# Patient Record
Sex: MALE | Race: WHITE | Employment: FULL TIME | URBAN - METROPOLITAN AREA
[De-identification: names, ages, dates, MRNs, and addresses within clinical notes are randomized per-mention and may not be internally consistent; named-entity substitution may affect disease eponyms.]

---

## 2019-06-18 ENCOUNTER — OFFICE VISIT (OUTPATIENT)
Dept: FAMILY MEDICINE | Facility: OTHER | Age: 34
End: 2019-06-18
Payer: COMMERCIAL

## 2019-06-18 VITALS
RESPIRATION RATE: 18 BRPM | TEMPERATURE: 98.1 F | HEART RATE: 64 BPM | WEIGHT: 250.7 LBS | SYSTOLIC BLOOD PRESSURE: 118 MMHG | HEIGHT: 73 IN | BODY MASS INDEX: 33.23 KG/M2 | DIASTOLIC BLOOD PRESSURE: 70 MMHG

## 2019-06-18 DIAGNOSIS — F32.0 MAJOR DEPRESSIVE DISORDER, SINGLE EPISODE, MILD (H): Primary | ICD-10-CM

## 2019-06-18 DIAGNOSIS — F15.11 HISTORY OF METHAMPHETAMINE ABUSE (H): ICD-10-CM

## 2019-06-18 DIAGNOSIS — F41.9 ANXIETY: ICD-10-CM

## 2019-06-18 DIAGNOSIS — Z13.220 SCREENING FOR HYPERLIPIDEMIA: ICD-10-CM

## 2019-06-18 PROCEDURE — 99203 OFFICE O/P NEW LOW 30 MIN: CPT | Performed by: PHYSICIAN ASSISTANT

## 2019-06-18 RX ORDER — BUSPIRONE HYDROCHLORIDE 5 MG/1
5 TABLET ORAL
COMMUNITY
End: 2019-10-15

## 2019-06-18 RX ORDER — BUPROPION HYDROCHLORIDE 150 MG/1
300 TABLET ORAL DAILY
COMMUNITY
End: 2019-06-18

## 2019-06-18 RX ORDER — BUPROPION HYDROCHLORIDE 150 MG/1
300 TABLET ORAL DAILY
Qty: 180 TABLET | Refills: 1 | Status: SHIPPED | OUTPATIENT
Start: 2019-06-18 | End: 2019-10-15

## 2019-06-18 ASSESSMENT — ANXIETY QUESTIONNAIRES
5. BEING SO RESTLESS THAT IT IS HARD TO SIT STILL: NOT AT ALL
GAD7 TOTAL SCORE: 2
7. FEELING AFRAID AS IF SOMETHING AWFUL MIGHT HAPPEN: NOT AT ALL
4. TROUBLE RELAXING: NOT AT ALL
3. WORRYING TOO MUCH ABOUT DIFFERENT THINGS: NOT AT ALL
1. FEELING NERVOUS, ANXIOUS, OR ON EDGE: SEVERAL DAYS
6. BECOMING EASILY ANNOYED OR IRRITABLE: SEVERAL DAYS
GAD7 TOTAL SCORE: 2
7. FEELING AFRAID AS IF SOMETHING AWFUL MIGHT HAPPEN: NOT AT ALL
2. NOT BEING ABLE TO STOP OR CONTROL WORRYING: NOT AT ALL
GAD7 TOTAL SCORE: 2

## 2019-06-18 ASSESSMENT — PATIENT HEALTH QUESTIONNAIRE - PHQ9
SUM OF ALL RESPONSES TO PHQ QUESTIONS 1-9: 1
SUM OF ALL RESPONSES TO PHQ QUESTIONS 1-9: 1

## 2019-06-18 ASSESSMENT — PAIN SCALES - GENERAL: PAINLEVEL: NO PAIN (0)

## 2019-06-18 ASSESSMENT — MIFFLIN-ST. JEOR: SCORE: 2114.04

## 2019-06-18 NOTE — PATIENT INSTRUCTIONS
"  Patient Education   Forms of Depression  Depression can happen to anyone man or woman, young or old. Sometimes it occurs for a reason, such as illness or grief. At other times, no cause can be found.  If you have depression, you cannot simply change your attitude or \"pull yourself out of it.\" You need treatment from a doctor, a therapist, or both.   The following is a list of the most common types of depression.  Reactive depression  Also called \"adjustment disorder with depressed mood.\" Symptoms of depression occur in response to major stress. For example, job loss, moving, the death of a loved one or a troubled relationship all can cause symptoms.  Dysthymia   Dysthymia is mild depression that lasts for at least two years (or at least one year in children). You may feel sad and tired almost every day. You might have low self-esteem. You may worry that you will never feel \"normal\" again.  Major depression   When symptoms last for at least two weeks and seem to have no cause, it is called severe depression. This is a serious illness that affects your mood, your thoughts and even your body. It changes the way you eat, sleep and interact with others. If symptoms are not treated, they can last for months or even years.  Postpartum depression  Postpartum depression may occur in women who have just had a baby. Symptoms last more than two weeks. They may be mild or severe.  Seasonal affective disorder (SAD)  SAD is a type of depression that occurs in the winter months, when there is less sunlight. Symptoms may begin in the late fall, peaking in the winter. When spring comes and the days grow longer, you start to feel better.   Bipolar disorder  Bipolar disorder causes severe mood swings that affect your thoughts, behavior and the way you function in life. This illness used to be called manic-depressive disorder. In the \"manic\" phase, you have lots of energy. You might talk a lot, sleep very little and act in reckless " "ways. In the \"depressed\" phase, you feel sad and low.   continued  Mood disorder caused by physical illness   Certain illnesses create changes in the body that can cause symptoms of depression. Examples include stroke, seizure disorders, Parkinson's disease and some cancers. This is not the same as depression that might occur when you are coping with medical problems.  Depression caused by alcohol and other drugs   Illegal drugs, alcohol and certain medicines can all cause symptoms of depression.   Where can I get more information?  [facility]: [phone]   ([facility]) or [phone] ([facility]: [phone]   or [phone]  Depression and Bipolar Support [place]:   [phone], www.dbsalliance.org  National [place] of Mental Health:   284.357.2446, www.nimh.nih.gov   National [place] on Mental Illness:   983-643-GQTH [5264], www.YAHIR.org  National Mental Health Association:   410-926-TFPS [6642], www.NMHA.org  For informational purposes only. Not to replace the advice of your health care provider.   Copyright   2006 [facility]. All rights reserved.   Clinically reviewed by Delia Mayers. Applaud [id number]   REV 04/18.       Patient Education     Depression Affects Your Mind and Body    Everyone feels sad or  blue  from time to time for a few days or weeks. Depression is when these feelings don't go away and they interfere with daily life.  Depression is a real illness that can develop at any age. It is one of the most common mental health problems in the [place] Depression makes you feel sad, helpless, and hopeless. It gets in the way of your life and relationships. It inhibits your ability to think and act. But, with help, you can feel better again.  Depression affects your whole body  Brain chemicals affect your body as well as your mood. So depression may do more than just make you feel low. You may also feel bad physically. Depression can:  " "  Cause trouble with mental tasks such as remembering, concentrating, or making decisions    Make you feel nervous and jumpy    Cause trouble sleeping. Or you may sleep too much    Change your appetite    Cause headaches, stomachaches, or other aches and pains    Drain your body of energy  Depression and other illness  It is common for people who have chronic health problems to also have depression. It can often be hard to tell which one caused the other. A person might become depressed after finding out they have a health problem. But some studies suggest being depressed may make certain health problems more likely. And some depressed people stop taking care of themselves. This may make them more likely to get sick.  Date Last Reviewed: 1/1/2017 2000-2018 [employer]. [address] [place] [address], [place] [address]. All rights reserved. This information is not intended as a substitute for professional medical care. Always follow your healthcare professional's instructions.           Patient Education     Know the Signs and Symptoms of Depression  Everyone feels down at times. The blues are a natural part of life. But an unhappy period that s intense or lasts for more than a couple of weeks can be a sign of depression.  Depression is a serious illness. It is not a sign of weakness or a \"character flaw,\" and it is not something you can \"snap out of.\" In fact, most people with depression need treatment to get better. Depression can disrupt the lives of family and friends. If you know someone you think may be depressed, find out what you can do to help.    Recognizing signs of depression  People who are depressed may:    Feel unhappy, sad, blue, down, or miserable nearly every day    Feel helpless, hopeless, or worthless    Lose interest in hobbies, friends, and activities that used to give pleasure    Not sleep well or sleep too much    Gain or lose weight    Feel low on energy or constantly tired    Have a " hard time concentrating or making decisions    Lose interest in sex    Have physical symptoms, such as stomachaches, headaches, or backaches  Know the serious signals  Never ignore a person's comments about suicide or behaviors that can lead to self-harm. Warning signals for suicide include:    Threats or talk of suicide    Statements such as  I won t be a problem much longer  or  Nothing matters     Giving away possessions or making a will or  arrangements    Buying a gun or other weapon    Sudden, unexplained cheerfulness or calm after a period of depression  If you notice any of these signs, get help right away. Call a healthcare professional, mental health clinic, or suicide hotline and ask what action to take. In an emergency, don t hesitate to call the police.  Resources:    National Institutes of Mental Lwjgjs046-292-0445oop.[facility].nih.gov    National [place] on Mental Ojgjojk620-770-5696cna.eder.org     Mental Health Gebukyr622-856-9612agn.[facility].org    National Suicide Qqlcjuo756-723-0856 (800-SUICIDE)    National Suicide Prevention [place]120-261-1902 (399-191-PDFB)www.suicidepreventionlifeline.org   Date Last Reviewed: 2017-2018 [employer]. [address] [place] [address], [place] [address]. All rights reserved. This information is not intended as a substitute for professional medical care. Always follow your healthcare professional's instructions.           Patient Education     Understanding Anxiety Disorders    Almost everyone gets nervous now and then. It s normal to have knots in your stomach before a test, or for your heart to race on a first date. But an anxiety disorder is much more than a case of nerves. In fact, its symptoms may be overwhelming. But treatment can relieve many of these symptoms. Talking to your healthcare provider is the first step.  What are anxiety disorders?  An anxiety disorder causes intense feelings of panic and fear. These feelings may arise for no  apparent reason. And they tend to recur again and again. They may prevent you from coping with life and cause you great distress. As a result, you may avoid anything that triggers your fear. In extreme cases, you may never leave the house. Anxiety disorders may cause other symptoms, such as:    Obsessive thoughts you can t control    Constant nightmares or painful thoughts of the past    Nausea, sweating, and muscle tension    Trouble sleeping or concentrating  What causes anxiety disorders?  Anxiety disorders tend to run in families. For some people, childhood abuse or neglect may play a role. For others, stressful life events or trauma may trigger anxiety disorders. Anxiety can trigger low self-esteem and poor coping skills.  Common anxiety disorders    Panic disorder. This causes an intense fear of being in danger.    Phobias. These are extreme fears of certain objects, places, or events.    Obsessive-compulsive disorder. This causes you to have unwanted thoughts and urges. You also may perform certain actions over and over.    Posttraumatic stress disorder. This occurs in people who have survived a terrible ordeal. It can cause nightmares and flashbacks about the event.    Generalized anxiety disorder. This causes constant worry that can greatly disrupt your life.   Getting better  You may believe that nothing can help you. Or, you might fear what others may think. But most anxiety symptoms can be eased. Having an anxiety disorder is nothing to be ashamed of. Most people do best with treatment that combines medicine and therapy. These aren t cures. But they can help you live a healthier life.  Date Last Reviewed: 2/1/2017 2000-2018 [employer]. [address] [place] [address], [place] [address]. All rights reserved. This information is not intended as a substitute for professional medical care. Always follow your healthcare professional's instructions.

## 2019-06-19 ASSESSMENT — PATIENT HEALTH QUESTIONNAIRE - PHQ9: SUM OF ALL RESPONSES TO PHQ QUESTIONS 1-9: 1

## 2019-06-19 ASSESSMENT — ANXIETY QUESTIONNAIRES: GAD7 TOTAL SCORE: 2

## 2019-10-14 NOTE — PROGRESS NOTES
Subjective     Sp Bonilla is a 34 year old male who presents to clinic today for the following health issues:    History of Present Illness        Mental Health Follow-up:  Patient presents to follow-up on Depression & Anxiety.Patient's depression since last visit has been:  Worse  The patient is having other symptoms associated with depression.  Patient's anxiety since last visit has been:  Worse  The patient is having other symptoms associated with anxiety.  Any significant life events: job concerns  Patient is feeling anxious or having panic attacks.  Patient has no concerns about alcohol or drug use.     Social History  Tobacco Use    Smoking status: Former Smoker      Packs/day: 1.50      Types: Cigarettes      Quit date: 2018      Years since quittin.5    Smokeless tobacco: Never Used  Alcohol use: Not Currently  Drug use: Never      Today's PHQ-9         PHQ-9 Total Score:     (P) 5   PHQ-9 Q9 Thoughts of better off dead/self-harm past 2 weeks :   (P) Not at all   Thoughts of suicide or self harm:      Self-harm Plan:        Self-harm Action:          Safety concerns for self or others:           He eats 0-1 servings of fruits and vegetables daily.He consumes 2 sweetened beverage(s) daily.He is missing 1 dose(s) of medications per week.  He is not taking prescribed medications regularly due to remembering to take.     Patient Active Problem List   Diagnosis     Major depressive disorder, single episode, mild (H)     Anxiety     History of methamphetamine abuse (H)     Chronic fatigue and malaise     History reviewed. No pertinent surgical history.    Social History     Tobacco Use     Smoking status: Former Smoker     Packs/day: 1.50     Types: Cigarettes     Last attempt to quit: 2018     Years since quittin.5     Smokeless tobacco: Never Used   Substance Use Topics     Alcohol use: Not Currently     History reviewed. No pertinent family history.      Current Outpatient Medications    Medication Sig Dispense Refill     buPROPion (WELLBUTRIN SR) 200 MG 12 hr tablet Take 1 tablet (200 mg) by mouth 2 times daily 180 tablet 1     No Known Allergies  BP Readings from Last 3 Encounters:   10/15/19 108/66   06/18/19 118/70   08/22/16 142/80    Wt Readings from Last 3 Encounters:   10/15/19 112 kg (247 lb)   06/18/19 113.7 kg (250 lb 11.2 oz)   08/22/16 84.8 kg (187 lb)                    Reviewed and updated as needed this visit by Provider         Review of Systems   ROS COMP: Constitutional, HEENT, cardiovascular, pulmonary, GI, , musculoskeletal, neuro, skin, endocrine and psych systems are negative, except as otherwise noted.      Objective    /66   Pulse 72   Temp 98.6  F (37  C) (Temporal)   Resp 16   Wt 112 kg (247 lb)   BMI 32.99 kg/m    Body mass index is 32.99 kg/m .  Physical Exam   GENERAL: healthy, alert and no distress  RESP: lungs clear to auscultation - no rales, rhonchi or wheezes  CV: regular rate and rhythm, normal S1 S2, no S3 or S4, no murmur, click or rub, no peripheral edema and peripheral pulses strong  ABDOMEN: soft, nontender, no hepatosplenomegaly, no masses and bowel sounds normal  MS: no gross musculoskeletal defects noted, no edema  SKIN: no suspicious lesions or rashes to visible skin today.  NEURO: Normal strength and tone, mentation intact and speech normal  PSYCH: mentation appears normal, affect normal/bright    Diagnostic Test Results:  Labs reviewed in Epic  No results found for this or any previous visit (from the past 24 hour(s)).        Assessment & Plan       ICD-10-CM    1. Anxiety F41.9 buPROPion (WELLBUTRIN SR) 200 MG 12 hr tablet     MENTAL HEALTH REFERRAL  - Adult; Outpatient Treatment; Individual/Couples/Family/Group Therapy/Health Psychology; [facility]: [facility] [phone]; We will contact you to schedule the appointment or please call with any questions     TSH with free T4 reflex     Vitamin D Deficiency     Vitamin B12     " CBC with platelets     Comprehensive metabolic panel     LDL cholesterol direct     **Testosterone Free and Total FUTURE anytime   2. Major depressive disorder, single episode, mild (H) F32.0 buPROPion (WELLBUTRIN SR) 200 MG 12 hr tablet     MENTAL HEALTH REFERRAL  - Adult; Outpatient Treatment; Individual/Couples/Family/Group Therapy/Health Psychology; [facility]: [facility] [phone]; We will contact you to schedule the appointment or please call with any questions     TSH with free T4 reflex     Vitamin D Deficiency     Vitamin B12     CBC with platelets     Comprehensive metabolic panel     LDL cholesterol direct     **Testosterone Free and Total FUTURE anytime   3. Chronic fatigue and malaise R53.82 buPROPion (WELLBUTRIN SR) 200 MG 12 hr tablet    R53.81 MENTAL HEALTH REFERRAL  - Adult; Outpatient Treatment; Individual/Couples/Family/Group Therapy/Health Psychology; [facility]: [facility] [phone]; We will contact you to schedule the appointment or please call with any questions     TSH with free T4 reflex     Vitamin D Deficiency     Vitamin B12     CBC with platelets     Comprehensive metabolic panel     LDL cholesterol direct     **Testosterone Free and Total FUTURE anytime        BMI:   Estimated body mass index is 32.99 kg/m  as calculated from the following:    Height as of 6/18/19: 1.843 m (6' 0.56\").    Weight as of this encounter: 112 kg (247 lb).     Return in about 4 weeks (around 11/12/2019) for Medication Recheck, recheck of current condition.    Kristopher White PA-C  [facility] MARK    Answers for HPI/ROS submitted by the patient on 10/15/2019   Chronic problems general questions HPI Form  If you checked off any problems, how difficult have these problems made it for you to do your work, take care of things at home, or get along with other people?: Somewhat difficult  PHQ9 TOTAL SCORE: 5  ALBA 7 TOTAL SCORE: 12    "

## 2019-10-15 ENCOUNTER — OFFICE VISIT (OUTPATIENT)
Dept: FAMILY MEDICINE | Facility: OTHER | Age: 34
End: 2019-10-15
Payer: COMMERCIAL

## 2019-10-15 VITALS
HEART RATE: 72 BPM | DIASTOLIC BLOOD PRESSURE: 66 MMHG | WEIGHT: 247 LBS | TEMPERATURE: 98.6 F | SYSTOLIC BLOOD PRESSURE: 108 MMHG | BODY MASS INDEX: 32.99 KG/M2 | RESPIRATION RATE: 16 BRPM

## 2019-10-15 DIAGNOSIS — R53.81 CHRONIC FATIGUE AND MALAISE: ICD-10-CM

## 2019-10-15 DIAGNOSIS — R53.82 CHRONIC FATIGUE AND MALAISE: ICD-10-CM

## 2019-10-15 DIAGNOSIS — F32.0 MAJOR DEPRESSIVE DISORDER, SINGLE EPISODE, MILD (H): ICD-10-CM

## 2019-10-15 DIAGNOSIS — F41.9 ANXIETY: Primary | ICD-10-CM

## 2019-10-15 LAB
ALBUMIN SERPL-MCNC: 3.5 G/DL (ref 3.4–5)
ALP SERPL-CCNC: 65 U/L (ref 40–150)
ALT SERPL W P-5'-P-CCNC: 23 U/L (ref 0–70)
ANION GAP SERPL CALCULATED.3IONS-SCNC: 5 MMOL/L (ref 3–14)
AST SERPL W P-5'-P-CCNC: 14 U/L (ref 0–45)
BILIRUB SERPL-MCNC: 0.3 MG/DL (ref 0.2–1.3)
BUN SERPL-MCNC: 24 MG/DL (ref 7–30)
CALCIUM SERPL-MCNC: 8.6 MG/DL (ref 8.5–10.1)
CHLORIDE SERPL-SCNC: 108 MMOL/L (ref 94–109)
CO2 SERPL-SCNC: 28 MMOL/L (ref 20–32)
CREAT SERPL-MCNC: 1.6 MG/DL (ref 0.66–1.25)
ERYTHROCYTE [DISTWIDTH] IN BLOOD BY AUTOMATED COUNT: 13.1 % (ref 10–15)
GFR SERPL CREATININE-BSD FRML MDRD: 54 ML/MIN/{1.73_M2}
GLUCOSE SERPL-MCNC: 88 MG/DL (ref 70–99)
HCT VFR BLD AUTO: 42.1 % (ref 40–53)
HGB BLD-MCNC: 13.7 G/DL (ref 13.3–17.7)
LDLC SERPL DIRECT ASSAY-MCNC: 125 MG/DL
MCH RBC QN AUTO: 28.4 PG (ref 26.5–33)
MCHC RBC AUTO-ENTMCNC: 32.5 G/DL (ref 31.5–36.5)
MCV RBC AUTO: 87 FL (ref 78–100)
PLATELET # BLD AUTO: 213 10E9/L (ref 150–450)
POTASSIUM SERPL-SCNC: 4.2 MMOL/L (ref 3.4–5.3)
PROT SERPL-MCNC: 6.9 G/DL (ref 6.8–8.8)
RBC # BLD AUTO: 4.83 10E12/L (ref 4.4–5.9)
SODIUM SERPL-SCNC: 141 MMOL/L (ref 133–144)
T4 FREE SERPL-MCNC: 1.18 NG/DL (ref 0.76–1.46)
TSH SERPL DL<=0.005 MIU/L-ACNC: 4.02 MU/L (ref 0.4–4)
WBC # BLD AUTO: 5.7 10E9/L (ref 4–11)

## 2019-10-15 PROCEDURE — 84403 ASSAY OF TOTAL TESTOSTERONE: CPT | Performed by: PHYSICIAN ASSISTANT

## 2019-10-15 PROCEDURE — 84443 ASSAY THYROID STIM HORMONE: CPT | Performed by: PHYSICIAN ASSISTANT

## 2019-10-15 PROCEDURE — 85027 COMPLETE CBC AUTOMATED: CPT | Performed by: PHYSICIAN ASSISTANT

## 2019-10-15 PROCEDURE — 82607 VITAMIN B-12: CPT | Performed by: PHYSICIAN ASSISTANT

## 2019-10-15 PROCEDURE — 82306 VITAMIN D 25 HYDROXY: CPT | Performed by: PHYSICIAN ASSISTANT

## 2019-10-15 PROCEDURE — 84270 ASSAY OF SEX HORMONE GLOBUL: CPT | Performed by: PHYSICIAN ASSISTANT

## 2019-10-15 PROCEDURE — 99214 OFFICE O/P EST MOD 30 MIN: CPT | Performed by: PHYSICIAN ASSISTANT

## 2019-10-15 PROCEDURE — 84439 ASSAY OF FREE THYROXINE: CPT | Performed by: PHYSICIAN ASSISTANT

## 2019-10-15 PROCEDURE — 36415 COLL VENOUS BLD VENIPUNCTURE: CPT | Performed by: PHYSICIAN ASSISTANT

## 2019-10-15 PROCEDURE — 80053 COMPREHEN METABOLIC PANEL: CPT | Performed by: PHYSICIAN ASSISTANT

## 2019-10-15 PROCEDURE — 83721 ASSAY OF BLOOD LIPOPROTEIN: CPT | Performed by: PHYSICIAN ASSISTANT

## 2019-10-15 RX ORDER — BUPROPION HYDROCHLORIDE 200 MG/1
200 TABLET, EXTENDED RELEASE ORAL 2 TIMES DAILY
Qty: 180 TABLET | Refills: 1 | Status: SHIPPED | OUTPATIENT
Start: 2019-10-15 | End: 2020-02-03

## 2019-10-15 ASSESSMENT — ANXIETY QUESTIONNAIRES
7. FEELING AFRAID AS IF SOMETHING AWFUL MIGHT HAPPEN: SEVERAL DAYS
6. BECOMING EASILY ANNOYED OR IRRITABLE: NEARLY EVERY DAY
5. BEING SO RESTLESS THAT IT IS HARD TO SIT STILL: NOT AT ALL
7. FEELING AFRAID AS IF SOMETHING AWFUL MIGHT HAPPEN: SEVERAL DAYS
2. NOT BEING ABLE TO STOP OR CONTROL WORRYING: MORE THAN HALF THE DAYS
GAD7 TOTAL SCORE: 12
4. TROUBLE RELAXING: MORE THAN HALF THE DAYS
3. WORRYING TOO MUCH ABOUT DIFFERENT THINGS: MORE THAN HALF THE DAYS
GAD7 TOTAL SCORE: 12
1. FEELING NERVOUS, ANXIOUS, OR ON EDGE: MORE THAN HALF THE DAYS
GAD7 TOTAL SCORE: 12

## 2019-10-15 ASSESSMENT — PATIENT HEALTH QUESTIONNAIRE - PHQ9
SUM OF ALL RESPONSES TO PHQ QUESTIONS 1-9: 5
10. IF YOU CHECKED OFF ANY PROBLEMS, HOW DIFFICULT HAVE THESE PROBLEMS MADE IT FOR YOU TO DO YOUR WORK, TAKE CARE OF THINGS AT HOME, OR GET ALONG WITH OTHER PEOPLE: SOMEWHAT DIFFICULT
SUM OF ALL RESPONSES TO PHQ QUESTIONS 1-9: 5

## 2019-10-15 ASSESSMENT — PAIN SCALES - GENERAL: PAINLEVEL: NO PAIN (0)

## 2019-10-15 NOTE — LETTER
October 17, 2019        Sp Bonilla  [address] [address]          Dear ,    We are writing to inform you of your test results.    Thyroid is only slightly elevated and we do not need to any type of changes with respect to medications or approach at this point time.  Repeat in 6 months    Resulted Orders   TSH with free T4 reflex   Result Value Ref Range    TSH 4.02 (H) 0.40 - 4.00 mU/L   Vitamin D Deficiency   Result Value Ref Range    Vitamin D Deficiency screening 33 20 - 75 ug/L      Comment:      Season, race, dietary intake, and treatment affect the concentration of   25-hydroxy-Vitamin D. Values may decrease during winter months and increase   during summer months. Values 20-29 ug/L may indicate Vitamin D insufficiency   and values <20 ug/L may indicate Vitamin D deficiency.  Vitamin D determination is routinely performed by an immunoassay specific for   25 hydroxyvitamin D3.  If an individual is on vitamin D2 (ergocalciferol)   supplementation, please specify 25 OH vitamin D2 and D3 level determination by   LCMSMS test VITD23.     Vitamin B12   Result Value Ref Range    Vitamin B12 454 193 - 986 pg/mL   CBC with platelets   Result Value Ref Range    WBC 5.7 4.0 - 11.0 10e9/L    RBC Count 4.83 4.4 - 5.9 10e12/L    Hemoglobin 13.7 13.3 - 17.7 g/dL    Hematocrit 42.1 40.0 - 53.0 %    MCV 87 78 - 100 fl    MCH 28.4 26.5 - 33.0 pg    MCHC 32.5 31.5 - 36.5 g/dL    RDW 13.1 10.0 - 15.0 %    Platelet Count 213 150 - 450 10e9/L   Comprehensive metabolic panel   Result Value Ref Range    Sodium 141 133 - 144 mmol/L    Potassium 4.2 3.4 - 5.3 mmol/L    Chloride 108 94 - 109 mmol/L    Carbon Dioxide 28 20 - 32 mmol/L    Anion Gap 5 3 - 14 mmol/L    Glucose 88 70 - 99 mg/dL    Urea Nitrogen 24 7 - 30 mg/dL    Creatinine 1.60 (H) 0.66 - 1.25 mg/dL    GFR Estimate 54 (L) >60 mL/min/[1.73_m2]      Comment:      Non  GFR Calc  Starting 12/18/2018, serum creatinine  based estimated GFR (eGFR) will be   calculated using the Chronic Kidney Disease Epidemiology Collaboration   (CKD-EPI) equation.      GFR Estimate If Black 63 >60 mL/min/[1.73_m2]      Comment:       GFR Calc  Starting 12/18/2018, serum creatinine based estimated GFR (eGFR) will be   calculated using the Chronic Kidney Disease Epidemiology Collaboration   (CKD-EPI) equation.      Calcium 8.6 8.5 - 10.1 mg/dL    Bilirubin Total 0.3 0.2 - 1.3 mg/dL    Albumin 3.5 3.4 - 5.0 g/dL    Protein Total 6.9 6.8 - 8.8 g/dL    Alkaline Phosphatase 65 40 - 150 U/L    ALT 23 0 - 70 U/L    AST 14 0 - 45 U/L   LDL cholesterol direct   Result Value Ref Range    LDL Cholesterol Direct 125 (H) <100 mg/dL      Comment:      Above desirable:  100-129 mg/dl  Borderline High:  130-159 mg/dL  High:             160-189 mg/dL  Very high:       >189 mg/dl     **Testosterone Free and Total FUTURE anytime   Result Value Ref Range    Testosterone Total 455 240 - 950 ng/dL      Comment:      This test was developed and its performance characteristics determined by the   [facility],  Special Chemistry Laboratory. It has   not been cleared or approved by the FDA. The laboratory is regulated under   CLIA as qualified to perform high-complexity testing. This test is used for   clinical purposes. It should not be regarded as investigational or for   research.      Sex Hormone Binding Globulin 43 11 - 80 nmol/L    Free Testosterone Calculated 7.95 4.7 - 24.4 ng/dL   T4 free   Result Value Ref Range    T4 Free 1.18 0.76 - 1.46 ng/dL       If you have any questions or concerns, please call the clinic at the number listed above.       Sincerely,        Kristopher White PA-C

## 2019-10-16 LAB
DEPRECATED CALCIDIOL+CALCIFEROL SERPL-MC: 33 UG/L (ref 20–75)
VIT B12 SERPL-MCNC: 454 PG/ML (ref 193–986)

## 2019-10-16 ASSESSMENT — PATIENT HEALTH QUESTIONNAIRE - PHQ9: SUM OF ALL RESPONSES TO PHQ QUESTIONS 1-9: 5

## 2019-10-16 ASSESSMENT — ANXIETY QUESTIONNAIRES: GAD7 TOTAL SCORE: 12

## 2019-10-17 LAB
SHBG SERPL-SCNC: 43 NMOL/L (ref 11–80)
TESTOST FREE SERPL-MCNC: 7.95 NG/DL (ref 4.7–24.4)
TESTOST SERPL-MCNC: 455 NG/DL (ref 240–950)

## 2019-11-13 ENCOUNTER — TELEPHONE (OUTPATIENT)
Dept: FAMILY MEDICINE | Facility: OTHER | Age: 34
End: 2019-11-13

## 2019-11-13 NOTE — TELEPHONE ENCOUNTER
Left message for patient to return call to clinic. When call is returned please inform patient pharmacy should have refills on file. J sent 6 months worth of this medication on 10/15/19 and it shows pharmacy received this.     Avrind Bal,

## 2019-11-13 NOTE — TELEPHONE ENCOUNTER
Reason for Call:  Medication or medication refill:    Do you use a [facility]?  Name of the pharmacy and phone number for the current request:  [facility] - [phone] (Fax [phone])    Name of the medication requested: WELLBUTRIN    Other request: Please call once RX is sent over.     Can we leave a detailed message on this number? NO    Phone number patient can be reached at: Cell number on file:    Telephone Information:   Mobile [phone]       Best Time: Any    Call taken on 11/13/2019 at 1:18 PM by Diana Patrick

## 2020-02-03 ENCOUNTER — OFFICE VISIT (OUTPATIENT)
Dept: FAMILY MEDICINE | Facility: OTHER | Age: 35
End: 2020-02-03

## 2020-02-03 VITALS
WEIGHT: 253 LBS | BODY MASS INDEX: 32.47 KG/M2 | HEART RATE: 80 BPM | HEIGHT: 74 IN | TEMPERATURE: 96.7 F | SYSTOLIC BLOOD PRESSURE: 124 MMHG | OXYGEN SATURATION: 97 % | RESPIRATION RATE: 16 BRPM | DIASTOLIC BLOOD PRESSURE: 60 MMHG

## 2020-02-03 DIAGNOSIS — R79.89 TSH ELEVATION: ICD-10-CM

## 2020-02-03 DIAGNOSIS — J02.9 PHARYNGITIS, UNSPECIFIED ETIOLOGY: Primary | ICD-10-CM

## 2020-02-03 LAB
DEPRECATED S PYO AG THROAT QL EIA: NORMAL
SPECIMEN SOURCE: NORMAL

## 2020-02-03 PROCEDURE — 87880 STREP A ASSAY W/OPTIC: CPT | Performed by: FAMILY MEDICINE

## 2020-02-03 PROCEDURE — 99213 OFFICE O/P EST LOW 20 MIN: CPT | Performed by: FAMILY MEDICINE

## 2020-02-03 PROCEDURE — 87081 CULTURE SCREEN ONLY: CPT | Performed by: FAMILY MEDICINE

## 2020-02-03 ASSESSMENT — MIFFLIN-ST. JEOR: SCORE: 2147.6

## 2020-02-03 ASSESSMENT — PAIN SCALES - GENERAL: PAINLEVEL: NO PAIN (0)

## 2020-02-03 NOTE — PATIENT INSTRUCTIONS
"Thank you for visiting [facility]    We'll let you know your strep results as soon as we can.    If negative, this is probably just a bit of a continued viral infection.    Continue tylenol or ibuprofen for comfort.    Other otc medications are OK, but be sure you're not getting too much of tylenol or ibuprofen.      Can try Flonase to help with your nasal congestion.  Can use twice daily if needed while you're ill.     If not improving as the week goes on, we can consider empiric antibiotics for possible bronchitis, but this seems less likely at this time.      Contact us or return if questions or concerns.     Have a nice day!    Dr. Rincon     No follow-ups on file.      If you had imaging scheduled please refer to your radiology prep sheet.      If you need medication refills, please contact your pharmacy 3 days before your prescriptions runs out or download the [facility] celso for your smart phone.   If you are out of refills, your pharmacy will contact contact the clinic.    Contact us or return if questions or concerns.     -Your [facility] Care Team:    MD Korin Fleming PA-C Joel De Haan, PA-C Anna Niesen, PA-C Elizabeth \"Tabtaha\" CICI Dhillon CNP, APRN, CICI Brown, MIKI Horton, JESSE, BSN       General information about your   [facility]      Clinic hours:     Lab hours:  Phone [phone]  Monday 7:30 am-7 pm    Monday 8:30 am-6:30 pm  Tuesday-Friday 7:30 am-5 pm   Tuesday-Friday 8:30 am-4:30 pm    Pharmacy hours:  Phone [phone]  Monday 8:30 am-7pm  Tuesday-Friday 8:30am-6 pm                                     Mychart assistance [phone]    We would like to hear from you, how was your visit today?    Nila Woods  Patient Information Supervisor   Patient Care Supervisor  [facility] [place] [facility]

## 2020-02-03 NOTE — PROGRESS NOTES
"Subjective     Sp Bonilla is a 34 year old male who presents to clinic today for the following health issues:    HPI   Acute Illness   Acute illness concerns: cough  Onset: x2 weeks     Fever: no    Chills/Sweats: YES     Headache (location?): no    Sinus Pressure:YES    Conjunctivitis:  no    Ear Pain: YES: both    Rhinorrhea: no    Congestion: no    Sore Throat: YES- little bit      Cough: YES-productive of green and brown sputum    Wheeze: no    Decreased Appetite: YES    Nausea: YES    Vomiting: no    Diarrhea:  YES    Dysuria/Freq.: no    Fatigue/Achiness: YES    Sick/Strep Exposure: no     Therapies Tried and outcome: Dayquil, nyquil    Pt recently back from [facility].    Has had sore throat and cough for about 2 weeks.  Then had some \"flu\" symptoms with nausea, but no vomiting.  Also some fever.  Cough subsided a bit.  Some sweats and feverishness too.  Also had some loose stools.      Also cough and HA.      Then improved for about a week.  If he doesn't take Dayquil or something, will lose his voice.    Cough has mostly subsided with medication.  ST is still there.   Nausea and diarrhea have resolved.      Stopped Wellbutrin about a month ago.  His stress had improved and he wasn't sure he still needed this.      No current outpatient medications on file.     No Known Allergies  Recent Labs   Lab Test 10/15/19  1616   *   ALT 23   CR 1.60*   GFRESTIMATED 54*   GFRESTBLACK 63   POTASSIUM 4.2   TSH 4.02*      BP Readings from Last 3 Encounters:   02/03/20 124/60   10/15/19 108/66   06/18/19 118/70    Wt Readings from Last 3 Encounters:   02/03/20 114.8 kg (253 lb)   10/15/19 112 kg (247 lb)   06/18/19 113.7 kg (250 lb 11.2 oz)                    Reviewed and updated as needed this visit by Provider         Review of Systems   ROS COMP: Constitutional, HEENT, cardiovascular, pulmonary, gi and gu systems are negative, except as otherwise noted.  See above.        Objective    /60   " "Pulse 80   Temp 96.7  F (35.9  C) (Temporal)   Resp 16   Ht 1.88 m (6' 2.02\")   Wt 114.8 kg (253 lb)   SpO2 97%   BMI 32.47 kg/m    Body mass index is 32.47 kg/m .  Physical Exam   GENERAL: healthy, alert and no distress  EYES: Eyes grossly normal to inspection, PERRL and conjunctivae and sclerae normal  HENT: normal cephalic/atraumatic, ear canals and TM's normal, nose and mouth without ulcers or lesions, rhinorrhea clear, oropharynx clear and oral mucous membranes moist  NECK: no adenopathy, no asymmetry, masses, or scars and thyroid normal to palpation  RESP: lungs clear to auscultation - no rales, rhonchi or wheezes  CV: regular rate and rhythm, normal S1 S2, no S3 or S4, no murmur, click or rub, no peripheral edema and peripheral pulses strong  ABDOMEN: soft, nontender, no hepatosplenomegaly, no masses and bowel sounds normal  MS: no gross musculoskeletal defects noted, no edema    Diagnostic Test Results:  Labs reviewed in Epic  Results for orders placed or performed in visit on 02/03/20   Strep, Rapid Screen     Status: None   Result Value Ref Range    Specimen Description Throat     Rapid Strep A Screen       NEGATIVE: No Group A streptococcal antigen detected by immunoassay, await culture report.           Assessment & Plan       ICD-10-CM    1. Pharyngitis, unspecified etiology J02.9 Strep, Rapid Screen     Beta strep group A culture   2. Creatinine elevation R79.89 Basic metabolic panel   3. TSH elevation R79.89 TSH with free T4 reflex     1.  Rapid strep test is negative.  Await confirmatory testing (can take up to 2 days).  I recommend supportive therapy - fluids, analgesics; teas possibly with honey, cayenne pepper, salt water gargles as desired.   2.  Previously noted.  We will recheck labs to ensure that he does not have chronic kidney disease.  3.  Previously noted.  We will recheck labs to ensure that he does not have thyroid dysfunction.    Portions of this note were completed using Dragon " "dictation software.  Although reviewed, there may be typographical and other inadvertent errors that remain.            BMI:   Estimated body mass index is 32.47 kg/m  as calculated from the following:    Height as of this encounter: 1.88 m (6' 2.02\").    Weight as of this encounter: 114.8 kg (253 lb).   Weight management plan: Discussed healthy diet and exercise guidelines        Patient Instructions   Thank you for visiting [facility]    We'll let you know your strep results as soon as we can.    If negative, this is probably just a bit of a continued viral infection.    Continue tylenol or ibuprofen for comfort.    Other otc medications are OK, but be sure you're not getting too much of tylenol or ibuprofen.      Can try Flonase to help with your nasal congestion.  Can use twice daily if needed while you're ill.     If not improving as the week goes on, we can consider empiric antibiotics for possible bronchitis, but this seems less likely at this time.      Contact us or return if questions or concerns.     Have a nice day!    Dr. Rincon     No follow-ups on file.      If you had imaging scheduled please refer to your radiology prep sheet.      If you need medication refills, please contact your pharmacy 3 days before your prescriptions runs out or download the [facility] celso for your smart phone.   If you are out of refills, your pharmacy will contact contact the clinic.    Contact us or return if questions or concerns.     -Your [facility] Care Team:    MD Korin Fleming PA-C Joel De Haan, PA-C Anna Niesen, PA-C Elizabeth \"Tabatha\" CICI Dhillon CNP, APRN, CICI Brown, MIKI Horton, RN, BSN       General information about your   [facility]      Clinic hours:     Lab hours:  Phone [phone]  Monday 7:30 am-7 pm    Monday 8:30 am-6:30 pm  Tuesday-Friday 7:30 am-5 pm   Tuesday-Friday 8:30 " am-4:30 pm    Pharmacy hours:  Phone [phone]  Monday 8:30 am-7pm  Tuesday-Friday 8:30am-6 pm                                     Tarynney assistance [phone]    We would like to hear from you, how was your visit today?    Nila Woods  Patient Information Supervisor   Patient Care Supervisor  [facility]                Return in about 2 weeks (around 2/17/2020), or if symptoms worsen or fail to improve, for Recheck.    Bryan Rincon MD, MD  [facility]

## 2020-02-04 LAB
BACTERIA SPEC CULT: NORMAL
SPECIMEN SOURCE: NORMAL

## 2020-02-10 ENCOUNTER — TELEPHONE (OUTPATIENT)
Dept: FAMILY MEDICINE | Facility: OTHER | Age: 35
End: 2020-02-10

## 2020-02-10 ENCOUNTER — VIRTUAL VISIT (OUTPATIENT)
Dept: FAMILY MEDICINE | Facility: OTHER | Age: 35
End: 2020-02-10

## 2020-02-10 DIAGNOSIS — R05.9 COUGH: ICD-10-CM

## 2020-02-10 DIAGNOSIS — J02.9 PHARYNGITIS, UNSPECIFIED ETIOLOGY: ICD-10-CM

## 2020-02-10 DIAGNOSIS — J20.9 ACUTE BRONCHITIS WITH SYMPTOMS > 10 DAYS: Primary | ICD-10-CM

## 2020-02-10 DIAGNOSIS — J04.0 LARYNGITIS, ACUTE: ICD-10-CM

## 2020-02-10 PROCEDURE — 99207 ZZC NO BILLABLE SERVICE THIS VISIT: CPT | Performed by: FAMILY MEDICINE

## 2020-02-10 RX ORDER — AZITHROMYCIN 250 MG/1
TABLET, FILM COATED ORAL
Qty: 6 TABLET | Refills: 0 | Status: SHIPPED | OUTPATIENT
Start: 2020-02-10 | End: 2020-03-11

## 2020-02-10 RX ORDER — PREDNISONE 20 MG/1
40 TABLET ORAL DAILY
Qty: 10 TABLET | Refills: 0 | Status: SHIPPED | OUTPATIENT
Start: 2020-02-10 | End: 2020-03-11

## 2020-02-10 NOTE — TELEPHONE ENCOUNTER
Reason for Call:  Other call back    Detailed comments: Patient called clinic. He seen Dr. Rincon last week and his cold symptoms have not improved at all. This is week 3. He wants to know what he should do, should he come in? He says if he comes in he hopes to actually to be treated. Please call patient to advise.     Phone Number Patient can be reached at: CELL: [phone]    Best Time: any    Can we leave a detailed message on this number? YES    Call taken on 2/10/2020 at 8:12 AM by Sally Jewell     55

## 2020-02-10 NOTE — TELEPHONE ENCOUNTER
Left message for patient to return call. When call is returned please help schedule. Per last office visit he is supposed to come back if things are not getting better.     Sally Atkins MA

## 2020-02-10 NOTE — TELEPHONE ENCOUNTER
Per process for appointments: if seen and treated and not improving needs to be seen.  Please call and help schedule.  Rodolfo Horton, RN, BSN

## 2020-02-10 NOTE — PROGRESS NOTES
"Sp Bonilla is a 34 year old male who is being evaluated via a billable telephone visit.      The patient has been notified of following:     \"This telephone visit will be conducted via a call between you and your physician/provider. We have found that certain health care needs can be provided without the need for a physical exam.  This service lets us provide the care you need with a short phone conversation.  If a prescription is necessary we can send it directly to your pharmacy.  If lab work is needed we can place an order for that and you can then stop by our lab to have the test done at a later time.    If during the course of the call the physician/provider feels a telephone visit is not appropriate, you will not be charged for this service.\"     Consent has been obtained for this service by 1 care team member: yes. See the scanned image in the medical record.    Sp Bonilla complains of    Chief Complaint   Patient presents with     URI       I have reviewed and updated the patient's Past Medical History, Social History, Family History and Medication List.    ALLERGIES  Patient has no known allergies.    Sally Atkins MA    (MA signature)    Additional provider notes:   Pt continues to have slightly productive cough, hoarse voice, ST.  Analgesics help slightly.  Never used Flonase.  Just taking dayquil/Nyquil.      Today, had 2 reddish chunks in what he vomited.      Denies fevers.      Doesn't feel like he is any better or worse than last week.      Assessment/Plan:    ICD-10-CM    1. Acute bronchitis with symptoms > 10 days J20.9 predniSONE (DELTASONE) 20 MG tablet     azithromycin (ZITHROMAX) 250 MG tablet   2. Laryngitis, acute J04.0 predniSONE (DELTASONE) 20 MG tablet     azithromycin (ZITHROMAX) 250 MG tablet   3. Cough R05 predniSONE (DELTASONE) 20 MG tablet     azithromycin (ZITHROMAX) 250 MG tablet   4. Pharyngitis, unspecified etiology J02.9       Given duration and severity of symptoms, " will treat with both steroids to help with his laryngitis as well as antibiotics to cover likely bronchitis.  Patient is to return to clinic for chest x-ray in a week if not improving.    Portions of this note were completed using Dragon dictation software.  Although reviewed, there may be typographical and other inadvertent errors that remain.       I have reviewed the note as documented above.  This accurately captures the substance of my conversation with the patient.  Sp Bonilla     Total time of call between patient and provider was 6 minutes     Bryan Rincon MD, MD

## 2020-02-10 NOTE — TELEPHONE ENCOUNTER
Attempted to contact patient but was unable to leave a message. Will try again later.     Sally Atkins MA

## 2020-03-11 ENCOUNTER — OFFICE VISIT (OUTPATIENT)
Dept: URGENT CARE | Facility: RETAIL CLINIC | Age: 35
End: 2020-03-11

## 2020-03-11 VITALS
TEMPERATURE: 99 F | HEART RATE: 83 BPM | OXYGEN SATURATION: 97 % | SYSTOLIC BLOOD PRESSURE: 120 MMHG | DIASTOLIC BLOOD PRESSURE: 82 MMHG

## 2020-03-11 DIAGNOSIS — B86 SCABIES: Primary | ICD-10-CM

## 2020-03-11 PROCEDURE — 99213 OFFICE O/P EST LOW 20 MIN: CPT | Performed by: NURSE PRACTITIONER

## 2020-03-11 RX ORDER — TRIAMCINOLONE ACETONIDE 5 MG/G
CREAM TOPICAL 2 TIMES DAILY
Qty: 15 G | Refills: 0 | Status: SHIPPED | OUTPATIENT
Start: 2020-03-11 | End: 2020-03-25

## 2020-03-11 ASSESSMENT — ENCOUNTER SYMPTOMS
FEVER: 0
NAUSEA: 0
SLEEP DISTURBANCE: 0
ABDOMINAL PAIN: 0
LIGHT-HEADEDNESS: 0
PALPITATIONS: 0
CHILLS: 0
CHEST TIGHTNESS: 0
SHORTNESS OF BREATH: 0
FATIGUE: 0
VOMITING: 0
ACTIVITY CHANGE: 0
PARESTHESIAS: 0
DIAPHORESIS: 0
APPETITE CHANGE: 0
DIZZINESS: 0
ADENOPATHY: 0
WHEEZING: 0
WEAKNESS: 0

## 2020-03-11 NOTE — PROGRESS NOTES
Chief Complaint   Patient presents with     Derm Problem     rash on both hands for almost a week     SUBJECTIVE:  Sp Bonilla is a 34 year old male who presents to the clinic today for a rash. He was fishing in [place] and wearing new gloves, possibly exposed to plant oils, new bedding. The rash in interdigital bilateral anterior hands, slightly pink erythematous elevated pinpoint pruritic dots. Feel similar to poison ivy.  Rash is gradual onset and still present.   Symptoms are mild and rash seems to be worsening.  Previous history of a similar rash? No  Treatment measures tried include:  none  Patient denies new meds, pets, foods, soaps, detergents, lotions.    No past medical history on file.  No current outpatient medications on file prior to visit.  No current facility-administered medications on file prior to visit.     Social History     Tobacco Use     Smoking status: Former Smoker     Packs/day: 1.50     Types: Cigarettes     Last attempt to quit: 2018     Years since quittin.9     Smokeless tobacco: Never Used   Substance Use Topics     Alcohol use: Not Currently     No Known Allergies    Review of Systems   Constitutional: Negative for activity change, appetite change, chills, diaphoresis, fatigue and fever.   Respiratory: Negative for chest tightness, shortness of breath and wheezing.    Cardiovascular: Negative for palpitations.   Gastrointestinal: Negative for abdominal pain, nausea and vomiting.   Skin: Positive for rash.   Neurological: Negative for dizziness, weakness, light-headedness and paresthesias.   Hematological: Negative for adenopathy.   Psychiatric/Behavioral: Negative for sleep disturbance.     EXAM:   /82   Pulse 83   Temp 99  F (37.2  C) (Oral)   SpO2 97%     Physical Exam  Vitals signs reviewed.   Constitutional:       Appearance: Normal appearance.   HENT:      Head: Normocephalic and atraumatic.      Nose: Nose normal.      Mouth/Throat:      Mouth: Mucous  membranes are moist.      Pharynx: Oropharynx is clear.   Eyes:      Extraocular Movements: Extraocular movements intact.      Conjunctiva/sclera: Conjunctivae normal.      Pupils: Pupils are equal, round, and reactive to light.   Neck:      Musculoskeletal: Normal range of motion and neck supple.   Cardiovascular:      Rate and Rhythm: Normal rate.   Pulmonary:      Effort: Pulmonary effort is normal.   Musculoskeletal: Normal range of motion.   Skin:     General: Skin is warm and dry.      Findings: Rash present.      Comments: Interdigital bilateral anterior hands, slightly pink erythematous elevated pinpoint pruritic dots, vesicles. There is no tunneling, but they are scattered in linear fashion.   Neurological:      General: No focal deficit present.      Mental Status: He is alert and oriented to person, place, and time.   Psychiatric:         Mood and Affect: Mood normal.         Behavior: Behavior normal.       ASSESSMENT:    ICD-10-CM    1. Scabies  B86 permethrin (ELIMITE) 1 % external lotion     triamcinolone (ARISTOCORT HP) 0.5 % external cream     PLAN:  Patient Instructions   Permethrin cream as directed.  Apply from chin to toes and leave on for 8-14 hours. Then rinse off.   Repeat process in 2 weeks if symptomatic.  Wash clothes and bedding in hot water. Vacuum mattress and furniture.  Follow up with primary care provider if no improvement.  Triamcinolone cream as needed 3 times daily for itch.  Take antihistamine for next 3-5 days: in evening take benadryl, in morning take loratadine, cetirizine or fexofenadine.  Cool compresses, ice packs, cooler showers for itching relief.  Baking soda paste, calamine lotion or aveeno oatmeal packs for itching.  Avoid sunlight and heat.  Avoid scratching to prevent secondary infection.  The mite burrows under the skin and lays eggs which quinonez in 2-4 days.  Larva turn in to adult mites in about 2 weeks.  Mites are most commonly passed through prolonged direct skin  to skin contact.  Mites can live for 24-36 hours off your body  Follow up with primary care provider with increasing symptoms or if symptoms fail to resolve.    Follow up with primary care provider with any problems, questions or concerns or if symptoms worsen or fail to improve. Patient agreed to plan and verbalized understanding.    Jennifer Irene, CRISSY-BC  [facility]

## 2020-03-11 NOTE — PATIENT INSTRUCTIONS
Permethrin cream as directed.  Apply from chin to toes and leave on for 8-14 hours. Then rinse off.   Repeat process in 2 weeks if symptomatic.  Wash clothes and bedding in hot water. Vacuum mattress and furniture.  Follow up with primary care provider if no improvement.  Triamcinolone cream as needed 3 times daily for itch.  Take antihistamine for next 3-5 days: in evening take benadryl, in morning take loratadine, cetirizine or fexofenadine.  Cool compresses, ice packs, cooler showers for itching relief.  Baking soda paste, calamine lotion or aveeno oatmeal packs for itching.  Avoid sunlight and heat.  Avoid scratching to prevent secondary infection.  The mite burrows under the skin and lays eggs which quinonez in 2-4 days.  Larva turn in to adult mites in about 2 weeks.  Mites are most commonly passed through prolonged direct skin to skin contact.  Mites can live for 24-36 hours off your body  Follow up with primary care provider with increasing symptoms or if symptoms fail to resolve.

## 2020-03-12 ENCOUNTER — TELEPHONE (OUTPATIENT)
Dept: FAMILY MEDICINE | Facility: OTHER | Age: 35
End: 2020-03-12

## 2020-03-12 DIAGNOSIS — B86 SCABIES: ICD-10-CM

## 2020-03-12 RX ORDER — PERMETHRIN 50 MG/G
CREAM TOPICAL
Qty: 60 G | Refills: 1 | Status: SHIPPED | OUTPATIENT
Start: 2020-03-12

## 2020-05-15 ENCOUNTER — TELEPHONE (OUTPATIENT)
Dept: FAMILY MEDICINE | Facility: OTHER | Age: 35
End: 2020-05-15

## 2020-05-15 NOTE — TELEPHONE ENCOUNTER
Panel Management Review    Summary:    Patient is due/failing the following:   BMP, TSH, FOLLOW UP, LDL, PHQ9 and PHYSICAL    Action needed:   Patient is in need of a video/telephone visit for Anxiety Depression recheck  Patient needs to do PHQ9  Patient needs fasting lab only appointment    Will defer for later date:  Patient needs office visit for Physical      Type of outreach:    Phone, left message for patient to call back.     Questions for provider review:    None                                                                                                                                    Fouzia Knox CMA ([facility])    Chart routed to Care Team .        Patient has the following on his problem list:     Depression / Dysthymia review    Measure:  Needs PHQ-9 score of 4 or less during index window.  Administer PHQ-9 and if score is 5 or more, send encounter to provider for next steps.    5 - 7 month window range:     PHQ-9 SCORE 8/28/2015 6/18/2019 10/15/2019   PHQ-9 Total Score - - -   PHQ-9 Total Score MyChart - 1 (Minimal depression) 5 (Mild depression)   PHQ-9 Total Score 16 1 5       If PHQ-9 recheck is 5 or more, route to provider for next steps.    Patient is due for:  PHQ9      Composite cancer screening  Chart review shows that this patient is due/due soon for the following None

## 2020-05-15 NOTE — LETTER
[facility]  [address] [place] [id number]  Phone: [phone]  May 19, 2020      Sp RUIZ Chad  [address] [facility] [facility] [address]      Dear Sp,    We care about your health and have reviewed your health plan including your medical conditions, medications, and lab results.  Based on this review, it is recommended that you follow up regarding the following health topic(s):  -Depression    We recommend you take the following action(s):  -schedule a LAB ONLY APPOINTMENT to recheck your: Lipids (fasting cholesterol - nothing to eat except water and/or meds for 8-10 hours), BMP (basic metabolic panel) and TSH (thyroid test) within the next 1-4 weeks.  If' you have had labs completed eslewhere, please let us know so we can update your records.    -schedule a FOLLOWUP APPOINTMENT.     Please call us at the [facility] - [phone] (or use [employer]) to address the above recommendations.     Thank you for trusting [facility] and we appreciate the opportunity to serve you.  We look forward to supporting your healthcare needs in the future.    Healthy Regards,    Your Health Care Team  [facility]

## 2020-06-24 ENCOUNTER — TELEPHONE (OUTPATIENT)
Dept: FAMILY MEDICINE | Facility: OTHER | Age: 35
End: 2020-06-24

## 2020-06-24 DIAGNOSIS — Z13.220 SCREENING FOR HYPERLIPIDEMIA: Primary | ICD-10-CM

## 2020-06-24 NOTE — TELEPHONE ENCOUNTER
Summary:    Patient is due/failing the following:   BMP, lipids, tsh, FOLLOW UP, PHQ9 and PHYSICAL    Action needed:   Patient needs office visit for annual physical. Please help schedule.  Patient needs virtual visit for med check. Due now please help schedule.  Patient needs to do PHQ9.   Patient needs fasting lab only appointment. Orders placed.    Type of outreach:    Phone, left message for patient to call back.     Questions for provider review:    None                                                                                                                                    Elke Peguero CMA ([facility])       Chart routed to Care Team .      Panel Management Review      Patient has the following on his problem list:   Depression / Dysthymia review    Measure:  Needs PHQ-9 score of 4 or less during index window.  Administer PHQ-9 and if score is 5 or more, send encounter to provider for next steps.    5 - 7 month window range:     PHQ-9 SCORE 8/28/2015 6/18/2019 10/15/2019   PHQ-9 Total Score - - -   PHQ-9 Total Score MyChart - 1 (Minimal depression) 5 (Mild depression)   PHQ-9 Total Score 16 1 5       If PHQ-9 recheck is 5 or more, route to provider for next steps.    Patient is due for:  PHQ9      Composite cancer screening  Chart review shows that this patient is due/due soon for the following None

## 2020-06-24 NOTE — LETTER
[facility]  [address] [place] [address]  Phone: [phone]  June 26, 2020      Sp RUIZ Chad  [address] [facility] [address]      Dear Sp,    We care about your health and have reviewed your health plan including your medical conditions, medications, and lab results.  Based on this review, it is recommended that you follow up regarding the following health topic(s):  -Cholesterol  -Wellness (Physical) Visit     We recommend you take the following action(s):  -schedule a WELLNESS (Physical) APPOINTMENT.  We will perform the following labs: Lipids (fasting cholesterol - nothing to eat except water and/or meds for 8-10 hours), BMP (basic metabolic panel) and TSH (thyroid test).     Please call us at the [facility] - [phone] (or use [employer]) to address the above recommendations.     Thank you for trusting [facility] and we appreciate the opportunity to serve you.  We look forward to supporting your healthcare needs in the future.    Healthy Regards,    Your Health Care Team  [facility]

## 2020-08-07 ENCOUNTER — TELEPHONE (OUTPATIENT)
Dept: FAMILY MEDICINE | Facility: OTHER | Age: 35
End: 2020-08-07

## 2020-08-07 NOTE — TELEPHONE ENCOUNTER
Summary:    Patient is due/failing the following:   BMP, TSH, PHQ9 and PHYSICAL    Action needed:   Patient needs office visit for Physical., Patient needs to do PHQ9. and Patient needs fasting lab only appointment    Type of outreach:    Phone, left message for patient to call back.   Update C2C on file  Questions for provider review:    None                                                                                                                                    Nessa Ferrari CMA       Chart routed to Care Team .

## 2020-10-19 ENCOUNTER — TELEPHONE (OUTPATIENT)
Dept: FAMILY MEDICINE | Facility: OTHER | Age: 35
End: 2020-10-19

## 2020-10-19 NOTE — TELEPHONE ENCOUNTER
Summary:    Patient is due/failing the following:   LDL, PHQ9 and PHYSICAL    Action needed:   Patient needs office visit for Physical., Patient needs to do PHQ9. and Patient needs fasting lab only appointment    Type of outreach:    Phone, spoke to patient.  patient declines at this time    Questions for provider review:    None                                                                                                                                    Nessa Ferrari CMA       Chart routed to Care Team .          Panel Management Review      Patient has the following on his problem list:     Depression / Dysthymia review    Measure:  Needs PHQ-9 score of 4 or less during index window.  Administer PHQ-9 and if score is 5 or more, send encounter to provider for next steps.        PHQ-9 SCORE 8/28/2015 6/18/2019 10/15/2019   PHQ-9 Total Score - - -   PHQ-9 Total Score MyChart - 1 (Minimal depression) 5 (Mild depression)   PHQ-9 Total Score 16 1 5       If PHQ-9 recheck is 5 or more, route to provider for next steps.    Patient is due for:  PHQ9      Composite cancer screening  Chart review shows that this patient is due/due soon for the following None